# Patient Record
Sex: FEMALE | Race: OTHER
[De-identification: names, ages, dates, MRNs, and addresses within clinical notes are randomized per-mention and may not be internally consistent; named-entity substitution may affect disease eponyms.]

---

## 2018-10-18 ENCOUNTER — HOSPITAL ENCOUNTER (EMERGENCY)
Dept: HOSPITAL 54 - ER | Age: 78
Discharge: HOME | End: 2018-10-18
Payer: MEDICARE

## 2018-10-18 VITALS — BODY MASS INDEX: 27.11 KG/M2 | WEIGHT: 153 LBS | HEIGHT: 63 IN

## 2018-10-18 VITALS — DIASTOLIC BLOOD PRESSURE: 90 MMHG | SYSTOLIC BLOOD PRESSURE: 147 MMHG

## 2018-10-18 DIAGNOSIS — M47.9: ICD-10-CM
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
## 2018-10-18 DIAGNOSIS — I10: ICD-10-CM

## 2018-10-18 DIAGNOSIS — S39.012A: Primary | ICD-10-CM

## 2018-10-18 DIAGNOSIS — E11.9: ICD-10-CM
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
## 2018-10-18 LAB
ALBUMIN SERPL BCP-MCNC: 3.3 G/DL (ref 3.4–5)
ALP SERPL-CCNC: 73 U/L (ref 46–116)
ALT SERPL W P-5'-P-CCNC: 15 U/L (ref 12–78)
AST SERPL W P-5'-P-CCNC: 14 U/L (ref 15–37)
BASOPHILS # BLD AUTO: 0.1 /CMM (ref 0–0.2)
BASOPHILS NFR BLD AUTO: 1.6 % (ref 0–2)
BILIRUB DIRECT SERPL-MCNC: 0 MG/DL (ref 0–0.2)
BILIRUB SERPL-MCNC: 0.4 MG/DL (ref 0.2–1)
BUN SERPL-MCNC: 15 MG/DL (ref 7–18)
CALCIUM SERPL-MCNC: 9.2 MG/DL (ref 8.5–10.1)
CHLORIDE SERPL-SCNC: 103 MMOL/L (ref 98–107)
CO2 SERPL-SCNC: 25 MMOL/L (ref 21–32)
CREAT SERPL-MCNC: 0.9 MG/DL (ref 0.6–1.3)
EOSINOPHIL NFR BLD AUTO: 0.2 % (ref 0–6)
GLUCOSE SERPL-MCNC: 303 MG/DL (ref 74–106)
GLUCOSE UR STRIP-MCNC: (no result) MG/DL
HCT VFR BLD AUTO: 36 % (ref 33–45)
HGB BLD-MCNC: 12.4 G/DL (ref 11.5–14.8)
KETONES UR STRIP-MCNC: 15 MG/DL
LIPASE SERPL-CCNC: 185 U/L (ref 73–393)
LYMPHOCYTES NFR BLD AUTO: 0.6 /CMM (ref 0.8–4.8)
LYMPHOCYTES NFR BLD AUTO: 6.9 % (ref 20–44)
MCHC RBC AUTO-ENTMCNC: 34 G/DL (ref 31–36)
MCV RBC AUTO: 91 FL (ref 82–100)
MONOCYTES NFR BLD AUTO: 0.5 /CMM (ref 0.1–1.3)
MONOCYTES NFR BLD AUTO: 5.5 % (ref 2–12)
NEUTROPHILS # BLD AUTO: 7.7 /CMM (ref 1.8–8.9)
NEUTROPHILS NFR BLD AUTO: 85.8 % (ref 43–81)
PH UR STRIP: 5.5 [PH] (ref 5–8)
PLATELET # BLD AUTO: 310 /CMM (ref 150–450)
POTASSIUM SERPL-SCNC: 4 MMOL/L (ref 3.5–5.1)
PROT SERPL-MCNC: 7.3 G/DL (ref 6.4–8.2)
RBC # BLD AUTO: 4 MIL/UL (ref 4–5.2)
RDW COEFFICIENT OF VARIATION: 12.1 (ref 11.5–15)
SODIUM SERPL-SCNC: 138 MMOL/L (ref 136–145)
TROPONIN I SERPL-MCNC: < 0.017 NG/ML (ref 0–0.06)
UROBILINOGEN UR STRIP-MCNC: 0.2 EU/DL
WBC #/AREA URNS HPF: (no result) /HPF (ref 0–3)
WBC NRBC COR # BLD AUTO: 8.9 K/UL (ref 4.3–11)

## 2018-10-18 PROCEDURE — 96374 THER/PROPH/DIAG INJ IV PUSH: CPT

## 2018-10-18 PROCEDURE — Z7610: HCPCS

## 2018-10-18 PROCEDURE — 83690 ASSAY OF LIPASE: CPT

## 2018-10-18 PROCEDURE — 99285 EMERGENCY DEPT VISIT HI MDM: CPT

## 2018-10-18 PROCEDURE — 81001 URINALYSIS AUTO W/SCOPE: CPT

## 2018-10-18 PROCEDURE — 85025 COMPLETE CBC W/AUTO DIFF WBC: CPT

## 2018-10-18 PROCEDURE — 36415 COLL VENOUS BLD VENIPUNCTURE: CPT

## 2018-10-18 PROCEDURE — 80076 HEPATIC FUNCTION PANEL: CPT

## 2018-10-18 PROCEDURE — 80048 BASIC METABOLIC PNL TOTAL CA: CPT

## 2018-10-18 PROCEDURE — A4606 OXYGEN PROBE USED W OXIMETER: HCPCS

## 2018-10-18 PROCEDURE — 72110 X-RAY EXAM L-2 SPINE 4/>VWS: CPT

## 2018-10-18 PROCEDURE — 84484 ASSAY OF TROPONIN QUANT: CPT

## 2018-10-18 NOTE — NUR
PT WALKED TO BATH ROOM AND THEN TO LOBBY WITH STAEDY GAIT DISCHARGED TO SON 
WITH ACI AND PRESCRIPTION

## 2018-10-18 NOTE — NUR
PT BIB [facility]AMEDICS FROM HOME FOR LOWER BSACK PAIN. SON AT BEDSIDE STATES THAT 
MOTHER WAS UNABLE TO STAND UP CRAWLING ON FLOOR. 2 MONTHS AGO PT HAD BACK PAIN 
MRI DONE NEGATIVE RESULTS. PT Gabonese SPEAKING ALERT AND ORIETED. PIV PLACED 
MEDICATION AND LABS DONE.

## 2022-05-06 ENCOUNTER — HOSPITAL ENCOUNTER (EMERGENCY)
Dept: HOSPITAL 54 - ER | Age: 82
Discharge: HOME | End: 2022-05-06
Payer: MEDICARE

## 2022-05-06 VITALS — WEIGHT: 108 LBS | BODY MASS INDEX: 24.3 KG/M2 | HEIGHT: 56 IN

## 2022-05-06 VITALS — SYSTOLIC BLOOD PRESSURE: 103 MMHG | DIASTOLIC BLOOD PRESSURE: 57 MMHG
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
## 2022-05-06 DIAGNOSIS — S52.502A: Primary | ICD-10-CM

## 2022-05-06 DIAGNOSIS — E11.9: ICD-10-CM
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
## 2022-05-06 DIAGNOSIS — I10: ICD-10-CM

## 2022-05-06 NOTE — NUR
TO ER BED 4. BIB SON C/O L ARM PAIN S/P TRIPPED AND FELL YESTERDAY. PT HAS 
BRUISING ON HER CHIN AND L ARM. VITALS ARE WITHIN PT BASELINE. NO RESP NOTED.

## 2022-07-29 ENCOUNTER — HOSPITAL ENCOUNTER (INPATIENT)
Dept: HOSPITAL 54 - ER | Age: 82
LOS: 3 days | Discharge: NURSING, REHAB OR LONG-TERM CARE | DRG: 914 | End: 2022-08-01
Attending: INTERNAL MEDICINE | Admitting: NURSE PRACTITIONER
Payer: MEDICARE

## 2022-07-29 VITALS — DIASTOLIC BLOOD PRESSURE: 65 MMHG | SYSTOLIC BLOOD PRESSURE: 111 MMHG

## 2022-07-29 VITALS — BODY MASS INDEX: 19.83 KG/M2 | WEIGHT: 101 LBS | HEIGHT: 60 IN

## 2022-07-29 VITALS — SYSTOLIC BLOOD PRESSURE: 115 MMHG | DIASTOLIC BLOOD PRESSURE: 65 MMHG

## 2022-07-29 DIAGNOSIS — M19.90: ICD-10-CM
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
## 2022-07-29 DIAGNOSIS — E78.5: ICD-10-CM

## 2022-07-29 DIAGNOSIS — Z20.822: ICD-10-CM

## 2022-07-29 DIAGNOSIS — Z91.81: ICD-10-CM

## 2022-07-29 DIAGNOSIS — M81.0: ICD-10-CM
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
## 2022-07-29 DIAGNOSIS — R29.6: ICD-10-CM

## 2022-07-29 DIAGNOSIS — S09.90XA: Primary | ICD-10-CM

## 2022-07-29 DIAGNOSIS — R26.89: ICD-10-CM

## 2022-07-29 DIAGNOSIS — E11.9: ICD-10-CM

## 2022-07-29 DIAGNOSIS — F03.90: ICD-10-CM

## 2022-07-29 DIAGNOSIS — I10: ICD-10-CM

## 2022-07-29 LAB
ALBUMIN SERPL BCP-MCNC: 3.1 G/DL (ref 3.4–5)
ALP SERPL-CCNC: 46 U/L (ref 46–116)
ALT SERPL W P-5'-P-CCNC: 16 U/L (ref 12–78)
AST SERPL W P-5'-P-CCNC: 12 U/L (ref 15–37)
BASOPHILS # BLD AUTO: 0.2 K/UL (ref 0–0.2)
BASOPHILS NFR BLD AUTO: 5.3 % (ref 0–2)
BASOPHILS NFR BLD MANUAL: 3 % (ref 0–2)
BILIRUB SERPL-MCNC: 0.3 MG/DL (ref 0.2–1)
BUN SERPL-MCNC: 15 MG/DL (ref 7–18)
CALCIUM SERPL-MCNC: 9.2 MG/DL (ref 8.5–10.1)
CHLORIDE SERPL-SCNC: 104 MMOL/L (ref 98–107)
CO2 SERPL-SCNC: 26 MMOL/L (ref 21–32)
CREAT SERPL-MCNC: 0.7 MG/DL (ref 0.6–1.3)
EOSINOPHIL NFR BLD AUTO: 1.2 % (ref 0–6)
GLUCOSE SERPL-MCNC: 96 MG/DL (ref 74–106)
HCT VFR BLD AUTO: 34 % (ref 33–45)
HGB BLD-MCNC: 12.1 G/DL (ref 11.5–14.8)
LYMPHOCYTES NFR BLD AUTO: 1 K/UL (ref 0.8–4.8)
LYMPHOCYTES NFR BLD AUTO: 23.8 % (ref 20–44)
LYMPHOCYTES NFR BLD MANUAL: 22 % (ref 16–48)
MCHC RBC AUTO-ENTMCNC: 36 G/DL (ref 31–36)
MCV RBC AUTO: 95 FL (ref 82–100)
MONOCYTES NFR BLD AUTO: 0.4 K/UL (ref 0.1–1.3)
MONOCYTES NFR BLD AUTO: 10 % (ref 2–12)
MONOCYTES NFR BLD MANUAL: 7 % (ref 0–11)
NEUTROPHILS # BLD AUTO: 2.6 K/UL (ref 1.8–8.9)
NEUTROPHILS NFR BLD AUTO: 59.7 % (ref 43–81)
NEUTS SEG NFR BLD MANUAL: 68 % (ref 42–76)
PLATELET # BLD AUTO: 294 K/UL (ref 150–450)
POTASSIUM SERPL-SCNC: 4 MMOL/L (ref 3.5–5.1)
PROT SERPL-MCNC: 7.1 G/DL (ref 6.4–8.2)
RBC # BLD AUTO: 3.56 MIL/UL (ref 4–5.2)
SODIUM SERPL-SCNC: 139 MMOL/L (ref 136–145)
WBC NRBC COR # BLD AUTO: 4.4 K/UL (ref 4.3–11)

## 2022-07-29 PROCEDURE — C9803 HOPD COVID-19 SPEC COLLECT: HCPCS

## 2022-07-29 PROCEDURE — G0378 HOSPITAL OBSERVATION PER HR: HCPCS

## 2022-07-29 RX ADMIN — SODIUM CHLORIDE PRN MLS/HR: 9 INJECTION, SOLUTION INTRAVENOUS at 22:52

## 2022-07-29 NOTE — NUR
RECEIVED REPORT FROM RUFUS CHAVEZ. PT IS 82YO FEMALE SUSTAINED A GLF, PT IS AAOX2, 
Greenlandic SPEAKING. WITH BRUISES ON THE FACE AND RIGHT SHOULDER FROM PREVIOUS 
FALLS. PATIENT HAS IV CANNULA ON LEFT AC G20. SON IS AT BEDSIDE TO INTERPRET. 
-SOB, -CP. DIAPER CHANGED. VITALS CHECKED AND BEING MONITORED.

## 2022-07-29 NOTE — NUR
RN admission notes

Received Pt from RUFUS Rueda. Pt is alert and orientedX2-3. Pt speaks Guinean and able to 
make needs known. On room air. No SOB. no S/S of distress noted. VS is stable. Afebrile. IV 
site at LAC# 20 is clean, intact, and flushes easily. Skin assessment is done and performed. 
Noted R forehead and R forearm bruises. Pt denies any pain at this time. Pictures are taken 
and placed at Pt's chart. Wound care consult ordered. Pt's belongings was checked by ABIGAIL Duvall. [place] Pt to the room and the use of call light. Pt verbalized understanding. Safety 
precautions is maintained. Bed at low position, brakes locked, side rails upX3, bed alarm is 
on, hob elevated and call light is within reach. Will continue to monitor.

## 2022-07-29 NOTE — NUR
To ER bed 1, [id number] from home, GLF this morning.  bruising to face and r 
shoulder from a previous fall. family concern about frequent falls. bg 138 pta. 
aaox2, breathing even and non labored, connected to monitor, awaiting md orders

## 2022-07-29 NOTE — NUR
RECEIVED REPORT FROM RUFUS CHAVEZ. PT IS 80YO FEMALE SUSTAINED A GLF, PT IS AAOX2, 
Beninese SPEAKING. WITH BRUISES ON THE FACE AND RIGHT SHOULDER FROM PREVIOUS 
FALLS. PATIENT HAS IV CANNULA ON LEFT AC G20. SON IS AT BEDSIDE TO INTERPRET. 
-SOB, -CP. DIAPER CHANGED. VITALS CHECKED AND BEING MONITORED.

## 2022-07-29 NOTE — NUR
RN notes

Pt's blood sugar is 86. No S/s of distress noted. Snacks is provided with juices. No S/S of 
distress noted. Assist Pt eating the snacks at the bedside without any difficulties. Pt 
verbalize understanding. Will continue to monitor.

## 2022-07-29 NOTE — NUR
RN notes

Informed and notified Dr. Garcia that Pt's has history of diabetes. MD ordered accucheck 
moderate sliding scale. Ordered carry out.

## 2022-07-30 VITALS — DIASTOLIC BLOOD PRESSURE: 74 MMHG | SYSTOLIC BLOOD PRESSURE: 136 MMHG

## 2022-07-30 VITALS — DIASTOLIC BLOOD PRESSURE: 67 MMHG | SYSTOLIC BLOOD PRESSURE: 120 MMHG

## 2022-07-30 VITALS — SYSTOLIC BLOOD PRESSURE: 122 MMHG | DIASTOLIC BLOOD PRESSURE: 75 MMHG

## 2022-07-30 LAB
BASOPHILS # BLD AUTO: 0 K/UL (ref 0–0.2)
BASOPHILS NFR BLD AUTO: 0.6 % (ref 0–2)
BUN SERPL-MCNC: 13 MG/DL (ref 7–18)
CALCIUM SERPL-MCNC: 8.7 MG/DL (ref 8.5–10.1)
CHLORIDE SERPL-SCNC: 105 MMOL/L (ref 98–107)
CO2 SERPL-SCNC: 24 MMOL/L (ref 21–32)
CREAT SERPL-MCNC: 0.6 MG/DL (ref 0.6–1.3)
EOSINOPHIL NFR BLD AUTO: 1.6 % (ref 0–6)
GLUCOSE SERPL-MCNC: 103 MG/DL (ref 74–106)
HCT VFR BLD AUTO: 29 % (ref 33–45)
HGB BLD-MCNC: 10.6 G/DL (ref 11.5–14.8)
LYMPHOCYTES NFR BLD AUTO: 1.2 K/UL (ref 0.8–4.8)
LYMPHOCYTES NFR BLD AUTO: 32.4 % (ref 20–44)
MAGNESIUM SERPL-MCNC: 2 MG/DL (ref 1.8–2.4)
MCHC RBC AUTO-ENTMCNC: 36 G/DL (ref 31–36)
MCV RBC AUTO: 98 FL (ref 82–100)
MONOCYTES NFR BLD AUTO: 0.4 K/UL (ref 0.1–1.3)
MONOCYTES NFR BLD AUTO: 11.7 % (ref 2–12)
NEUTROPHILS # BLD AUTO: 2 K/UL (ref 1.8–8.9)
NEUTROPHILS NFR BLD AUTO: 53.7 % (ref 43–81)
PHOSPHATE SERPL-MCNC: 4.5 MG/DL (ref 2.5–4.9)
PLATELET # BLD AUTO: 270 K/UL (ref 150–450)
POTASSIUM SERPL-SCNC: 3.7 MMOL/L (ref 3.5–5.1)
RBC # BLD AUTO: 2.99 MIL/UL (ref 4–5.2)
SODIUM SERPL-SCNC: 140 MMOL/L (ref 136–145)
WBC NRBC COR # BLD AUTO: 3.8 K/UL (ref 4.3–11)

## 2022-07-30 RX ADMIN — Medication SCH EACH: at 17:16

## 2022-07-30 RX ADMIN — GLIMEPIRIDE SCH MG: 1 TABLET ORAL at 17:47

## 2022-07-30 RX ADMIN — MELOXICAM SCH MG: 7.5 TABLET ORAL at 21:38

## 2022-07-30 RX ADMIN — ENOXAPARIN SODIUM SCH MG: 40 INJECTION SUBCUTANEOUS at 17:31

## 2022-07-30 RX ADMIN — Medication SCH EACH: at 11:46

## 2022-07-30 RX ADMIN — Medication SCH EACH: at 21:38

## 2022-07-30 RX ADMIN — Medication SCH MG: at 08:58

## 2022-07-30 RX ADMIN — LOSARTAN POTASSIUM SCH MG: 25 TABLET, FILM COATED ORAL at 08:59

## 2022-07-30 RX ADMIN — CARVEDILOL SCH MG: 3.12 TABLET, FILM COATED ORAL at 08:59

## 2022-07-30 RX ADMIN — METFORMIN HYDROCHLORIDE SCH MG: 500 TABLET, FILM COATED ORAL at 08:58

## 2022-07-30 RX ADMIN — SIMVASTATIN SCH MG: 20 TABLET, FILM COATED ORAL at 21:38

## 2022-07-30 RX ADMIN — GLIMEPIRIDE SCH MG: 1 TABLET ORAL at 08:00

## 2022-07-30 RX ADMIN — INSULIN HUMAN PRN UNITS: 100 INJECTION, SOLUTION PARENTERAL at 12:35

## 2022-07-30 RX ADMIN — MELOXICAM SCH MG: 7.5 TABLET ORAL at 08:58

## 2022-07-30 RX ADMIN — MAGNESIUM OXIDE TAB 400 MG (241.3 MG ELEMENTAL MG) SCH MG: 400 (241.3 MG) TAB at 08:58

## 2022-07-30 RX ADMIN — MEMANTINE HYDROCHLORIDE SCH MG: 5 TABLET ORAL at 08:58

## 2022-07-30 RX ADMIN — CARVEDILOL SCH MG: 3.12 TABLET, FILM COATED ORAL at 21:38

## 2022-07-30 RX ADMIN — Medication SCH EACH: at 07:00

## 2022-07-30 RX ADMIN — GLIMEPIRIDE SCH MG: 1 TABLET ORAL at 17:32

## 2022-07-30 RX ADMIN — Medication SCH ML: at 17:16

## 2022-07-30 RX ADMIN — INSULIN HUMAN PRN UNITS: 100 INJECTION, SOLUTION PARENTERAL at 07:00

## 2022-07-30 NOTE — NUR
RN closing notes

Pt is resting in bed comfortably. Pt is alert and orientedX2-3. On room air. No SOB. No S/S 
of distress noted. VS is stable.  IV site at R hand# 20 is clean, intact and infusing well 
NS@ 75 ml/hr. LAc# 20 is clean, intact and SL. kept Pt clean, dry and comfortable. Safety 
precautions is maintianed. Bed at low position, brakes locked, side rails upX3, hob 
elevated, bed alarm is on and call light is within reach. Will endorse to am nurse for AMANDEEP.

## 2022-07-30 NOTE — NUR
MS RN CLOSING NOTES



PATIENT LAYING IN BED, A/O X 2-3, TOLERATING WELL ON ROOM AIR WITH NO S/S RESPIRATORY 
DISTRESS. NO COMPLAINTS OF PAIN OR DISCOMFORT AT THIS TIME. R HAND # 20 G IV CLEAN, INTACT, 
FLUSHING WELL. L FOREARM # 20 G IV CLEAN, INTACT, AND INFUSING NS @ 75 ML/HR. SAFETY 
MEASURES IN PLACE: BED IN LOWEST LOCKED POSITION, SIDE RAILS UP X 2, CALL LIGHT WITHIN 
REACH. ALL NEEDS MET. WILL ENDORSE TO NIGHT SHIFT FOR AMANDEEP.

## 2022-07-30 NOTE — NUR
MS RN NOTES





NOTED PT HAD BOTH IV SITES PULLED OUT CLEANED AREA EXTABLISHED NEW IV ACCESS ON THE RAC 22G 
TOLERATED WELL FLUSHING WELL.

## 2022-07-30 NOTE — NUR
MS RN OPENING NOTES



PATIENT LAYING IN BED, A/O X 2-3, TOLERATING WELL ON ROOM AIR WITH NO S/S RESPIRATORY 
DISTRESS. NO COMPLAINTS OF PAIN OR DISCOMFORT AT THIS TIME. R HAND # 20 G IV CLEAN, INTACT, 
AND INFUSING NS @ 75 ML/HR. SAFETY MEASURES IN PLACE: BED IN LOWEST LOCKED POSITION, SIDE 
RAILS UP X 2, CALL LIGHT WITHIN REACH. WILL CONTINUE TO MONITOR.

## 2022-07-31 VITALS — DIASTOLIC BLOOD PRESSURE: 82 MMHG | SYSTOLIC BLOOD PRESSURE: 134 MMHG

## 2022-07-31 VITALS — DIASTOLIC BLOOD PRESSURE: 68 MMHG | SYSTOLIC BLOOD PRESSURE: 134 MMHG

## 2022-07-31 VITALS — DIASTOLIC BLOOD PRESSURE: 66 MMHG | SYSTOLIC BLOOD PRESSURE: 126 MMHG

## 2022-07-31 RX ADMIN — LOSARTAN POTASSIUM SCH MG: 25 TABLET, FILM COATED ORAL at 09:00

## 2022-07-31 RX ADMIN — MELOXICAM SCH MG: 7.5 TABLET ORAL at 08:21

## 2022-07-31 RX ADMIN — METFORMIN HYDROCHLORIDE SCH MG: 500 TABLET, FILM COATED ORAL at 08:21

## 2022-07-31 RX ADMIN — Medication SCH MG: at 08:20

## 2022-07-31 RX ADMIN — MAGNESIUM OXIDE TAB 400 MG (241.3 MG ELEMENTAL MG) SCH MG: 400 (241.3 MG) TAB at 09:00

## 2022-07-31 RX ADMIN — INSULIN HUMAN PRN UNITS: 100 INJECTION, SOLUTION PARENTERAL at 06:55

## 2022-07-31 RX ADMIN — Medication SCH EACH: at 16:42

## 2022-07-31 RX ADMIN — MELOXICAM SCH MG: 7.5 TABLET ORAL at 21:03

## 2022-07-31 RX ADMIN — Medication SCH MG: at 09:00

## 2022-07-31 RX ADMIN — Medication SCH EACH: at 06:55

## 2022-07-31 RX ADMIN — CARVEDILOL SCH MG: 3.12 TABLET, FILM COATED ORAL at 09:00

## 2022-07-31 RX ADMIN — CARVEDILOL SCH MG: 3.12 TABLET, FILM COATED ORAL at 08:21

## 2022-07-31 RX ADMIN — MELOXICAM SCH MG: 7.5 TABLET ORAL at 09:00

## 2022-07-31 RX ADMIN — GLIMEPIRIDE SCH MG: 1 TABLET ORAL at 08:00

## 2022-07-31 RX ADMIN — GLIMEPIRIDE SCH MG: 1 TABLET ORAL at 17:12

## 2022-07-31 RX ADMIN — CARVEDILOL SCH MG: 3.12 TABLET, FILM COATED ORAL at 21:03

## 2022-07-31 RX ADMIN — METFORMIN HYDROCHLORIDE SCH MG: 500 TABLET, FILM COATED ORAL at 09:00

## 2022-07-31 RX ADMIN — Medication SCH EACH: at 21:14

## 2022-07-31 RX ADMIN — MEMANTINE HYDROCHLORIDE SCH MG: 5 TABLET ORAL at 09:00

## 2022-07-31 RX ADMIN — MEMANTINE HYDROCHLORIDE SCH MG: 5 TABLET ORAL at 08:21

## 2022-07-31 RX ADMIN — Medication SCH ML: at 08:14

## 2022-07-31 RX ADMIN — Medication SCH ML: at 16:24

## 2022-07-31 RX ADMIN — SIMVASTATIN SCH MG: 20 TABLET, FILM COATED ORAL at 21:03

## 2022-07-31 RX ADMIN — LOSARTAN POTASSIUM SCH MG: 25 TABLET, FILM COATED ORAL at 08:20

## 2022-07-31 RX ADMIN — Medication SCH EACH: at 11:30

## 2022-07-31 RX ADMIN — GLIMEPIRIDE SCH MG: 1 TABLET ORAL at 08:14

## 2022-07-31 RX ADMIN — MAGNESIUM OXIDE TAB 400 MG (241.3 MG ELEMENTAL MG) SCH MG: 400 (241.3 MG) TAB at 08:21

## 2022-07-31 RX ADMIN — SODIUM CHLORIDE PRN MLS/HR: 9 INJECTION, SOLUTION INTRAVENOUS at 22:50

## 2022-07-31 RX ADMIN — ENOXAPARIN SODIUM SCH MG: 40 INJECTION SUBCUTANEOUS at 16:21

## 2022-07-31 NOTE — NUR
MS RN OPENING NOTE



RECEIVED PATIENT AWAKE IN BED. A/O X 3 AND ABLE TO MAKE NEEDS KNOWN. Kyrgyz SPEAKING. 
STABLE ON ROOM AIR. NO SOB OR S/S OF RESPIRATORY DISTRESS. BREATHING EVEN AND UNLABORED. NO 
COMPLAINTS OF PAIN OR DISCOMFORT AT THIS TIME.  IV ACCESS LFA 22G, INTACT AND PATENT, 
RUNNING NS @ 75 ML/HR. SAFETY PRECAUTIONS IN PLACE. BED IN LOWEST LOCKED POSITION, SIDE 
RAILS UP X 2, HOB ELEVATED, AND CALL LIGHT AND TABLE WITHIN REACH. ALL NEEDS MET AT THIS 
TIME.

## 2022-07-31 NOTE — NUR
MS RN NOTES



22 G IV INSERTED IN RIGHT FOREARM, CURRENTLY INFUSING NS @ 75 ML/HR, PATIENT CALM AND 
TOLERATING WELL.

## 2022-07-31 NOTE — NUR
MS RN CLOSING NOTES



PATIENT LAYING IN BED, A/O X 3, TOLERATING WELL ON ROOM AIR WITH NO S/S RESPIRATORY 
DISTRESS. NO COMPLAINTS OF PAIN OR DISCOMFORT AT THIS TIME.  RAC 22G S/L INTACT, FLUSHING 
WELL. SAFETY MEASURES IN PLACE: BED IN LOWEST LOCKED POSITION, SIDE RAILS UP X 2, CALL LIGHT 
WITHIN REACH. ALL NEEDS MET. WILL ENDORSE CARE TO DAY SHIFT NURSE.

## 2022-07-31 NOTE — NUR
MS RN NOTES



PATIENT HAD REFUSED ALL MORNING MEDICATIONS AND PULLED OUT IV LINE. I SPOKE WITH PATIENT'S 
SON ROSE WHO INTERPRETED FOR ME AND STATED PATIENT BELIEVED SHE DID NOT NEED HER 
MEDICATIONS BECAUSE SHE THOUGHT SHE WAS BEING DISCHARGED FROM THE HOSPITAL SOON. SON 
EXPLAINED TO THE PATIENT THAT SHE WAS NOT LEAVING IMMEDIATELY AND SHE SHOULD CONTINUE TO 
TAKE HER MEDICATIONS. PATIENT AGREED TO TAKE HER MEDICATIONS GOING FORWARD.

## 2022-07-31 NOTE — NUR
MS RN CLOSING NOTES



PATIENT LAYING IN BED, A/O X 3, TOLERATING WELL ON ROOM AIR WITH NO S/S RESPIRATORY 
DISTRESS. NO COMPLAINTS OF PAIN OR DISCOMFORT AT THIS TIME. LEFT FOREARM # 22 G IV CLEAN AND 
INTACT WITH NS INFUSING @ 75 ML/HR. SAFETY MEASURES IN PLACE: BED IN LOWEST LOCKED POSITION, 
SIDE RAILS UP X 2, CALL LIGHT WITHIN REACH. ALL NEEDS MET. WILL ENDORSE TO NIGHT SHIFT FOR 
AMANDEEP.

## 2022-07-31 NOTE — NUR
MS RN OPENING NOTES



PATIENT LAYING IN BED, A/O X 3, TOLERATING WELL ON ROOM AIR WITH NO S/S RESPIRATORY 
DISTRESS. NO COMPLAINTS OF PAIN OR DISCOMFORT AT THIS TIME.  R AC # 22 G SL CLEAN, INTACT, 
FLUSHING WELL. SAFETY MEASURES IN PLACE: BED IN LOWEST LOCKED POSITION, SIDE RAILS UP X 2, 
CALL LIGHT WITHIN REACH. WILL CONTINUE TO MONITOR.

## 2022-08-01 VITALS — DIASTOLIC BLOOD PRESSURE: 42 MMHG | SYSTOLIC BLOOD PRESSURE: 116 MMHG

## 2022-08-01 RX ADMIN — LOSARTAN POTASSIUM SCH MG: 25 TABLET, FILM COATED ORAL at 08:22

## 2022-08-01 RX ADMIN — MELOXICAM SCH MG: 7.5 TABLET ORAL at 08:22

## 2022-08-01 RX ADMIN — Medication SCH ML: at 08:23

## 2022-08-01 RX ADMIN — Medication SCH EACH: at 06:32

## 2022-08-01 RX ADMIN — CARVEDILOL SCH MG: 3.12 TABLET, FILM COATED ORAL at 08:22

## 2022-08-01 RX ADMIN — METFORMIN HYDROCHLORIDE SCH MG: 500 TABLET, FILM COATED ORAL at 08:22

## 2022-08-01 RX ADMIN — Medication SCH MG: at 08:22

## 2022-08-01 RX ADMIN — MEMANTINE HYDROCHLORIDE SCH MG: 5 TABLET ORAL at 08:22

## 2022-08-01 RX ADMIN — MAGNESIUM OXIDE TAB 400 MG (241.3 MG ELEMENTAL MG) SCH MG: 400 (241.3 MG) TAB at 08:22

## 2022-08-01 RX ADMIN — Medication SCH EACH: at 11:33

## 2022-08-01 RX ADMIN — GLIMEPIRIDE SCH MG: 1 TABLET ORAL at 08:22

## 2022-08-01 NOTE — NUR
RN DISCHARGE NOTE



PATIENT DISCHARGED TO [facility] IN STABLE CONDITION. PATIENT IS ALERT 
AND ORIENTED X3, [place]ib[place]ian SPEAKING, UNDERSTAND VERY LITTLE ENGLISH. IV ACCESS ON LFA REMOVED, 
NO BLEEDING NOTED, PRESSURE DRESSING APPLIED TO SITE.  ALL BELONGINGS ACCOUNTED FOR, FORM 
SIGNED BY CHAS ROSE AT BEDSIDE. REPORT GIVEN TO RUFUS MANN FROM [facility]  
@1400. EXITCARE FOLDER GIVEN TO AMBULANCE. PATIENT LEFT UNIT @[id number]. CN AWARE OF DISCHARGE.

## 2022-08-01 NOTE — NUR
RN OPENING NOTES



RECEIVED PATIENT IN BED, AWAKE, A/O X 3, ON ROOM AIR WITH NO S/S RESPIRATORY DISTRESS NOTED. 
DENIES ANY PAIN OR DISCOMFORT AT THIS TIME.  NOTED WITH IV ACCESS ON LEFT FORE ARM # 22 G, 
SL. FLUSHING WELL. SAFETY MEASURES IN PLACE: BED IN LOWEST LOCKED POSITION, BED ALARM ON, 
SIDE RAILS UP X 2, CALL LIGHT WITHIN REACH. WILL CONTINUE TO MONITOR PATIENT.

## 2022-08-01 NOTE — NUR
MS RN CLOSING NOTE



PATIENT AWAKE IN BED. A/O X 3 AND ABLE TO MAKE NEEDS KNOWN. Ethiopian SPEAKING. STABLE ON ROOM 
AIR. NO SOB OR S/S OF RESPIRATORY DISTRESS. BREATHING EVEN AND UNLABORED. NO COMPLAINTS OF 
PAIN OR DISCOMFORT AT THIS TIME.  IV ACCESS LFA 22G, INTACT AND PATENT, RUNNING NS @ 75 
ML/HR. ALL DUE MEDS GIVEN AS ORDERED. SAFETY PRECAUTIONS IN PLACE AT ALL TIMES. BED IN 
LOWEST LOCKED POSITION, SIDE RAILS UP X 2, HOB ELEVATED, AND CALL LIGHT AND TABLE WITHIN 
REACH. ALL NEEDS MET AT THIS TIME AND WILL ENDORSE TO ONCOMING SHIFT FOR AMANDEEP.